# Patient Record
Sex: MALE | Race: WHITE | NOT HISPANIC OR LATINO | Employment: FULL TIME | URBAN - METROPOLITAN AREA
[De-identification: names, ages, dates, MRNs, and addresses within clinical notes are randomized per-mention and may not be internally consistent; named-entity substitution may affect disease eponyms.]

---

## 2017-09-15 ENCOUNTER — CLINICAL SUPPORT (OUTPATIENT)
Dept: OCCUPATIONAL MEDICINE | Facility: CLINIC | Age: 37
End: 2017-09-15

## 2017-09-15 DIAGNOSIS — Z02.83 ENCOUNTER FOR EMPLOYMENT-RELATED DRUG TESTING: ICD-10-CM

## 2017-09-15 PROCEDURE — 80305 DRUG TEST PRSMV DIR OPT OBS: CPT | Mod: S$GLB,,, | Performed by: PHYSICIAN ASSISTANT

## 2018-07-05 ENCOUNTER — CLINICAL SUPPORT (OUTPATIENT)
Dept: OCCUPATIONAL MEDICINE | Facility: CLINIC | Age: 38
End: 2018-07-05

## 2018-07-05 DIAGNOSIS — Z02.83 ENCOUNTER FOR DRUG SCREENING: ICD-10-CM

## 2018-07-05 PROCEDURE — 80305 DRUG TEST PRSMV DIR OPT OBS: CPT | Mod: S$GLB,,, | Performed by: PREVENTIVE MEDICINE

## 2018-07-31 ENCOUNTER — OFFICE VISIT (OUTPATIENT)
Dept: PODIATRY | Facility: CLINIC | Age: 38
End: 2018-07-31
Payer: COMMERCIAL

## 2018-07-31 VITALS — WEIGHT: 293.56 LBS | HEIGHT: 68 IN | BODY MASS INDEX: 44.49 KG/M2

## 2018-07-31 DIAGNOSIS — B35.1 ONYCHOMYCOSIS DUE TO DERMATOPHYTE: ICD-10-CM

## 2018-07-31 DIAGNOSIS — B35.3 TINEA PEDIS OF BOTH FEET: Primary | ICD-10-CM

## 2018-07-31 PROCEDURE — 11720 DEBRIDE NAIL 1-5: CPT | Mod: S$GLB,,, | Performed by: PODIATRIST

## 2018-07-31 PROCEDURE — 3008F BODY MASS INDEX DOCD: CPT | Mod: CPTII,S$GLB,, | Performed by: PODIATRIST

## 2018-07-31 PROCEDURE — 99999 PR PBB SHADOW E&M-EST. PATIENT-LVL III: CPT | Mod: PBBFAC,,, | Performed by: PODIATRIST

## 2018-07-31 PROCEDURE — 99203 OFFICE O/P NEW LOW 30 MIN: CPT | Mod: 25,S$GLB,, | Performed by: PODIATRIST

## 2018-07-31 RX ORDER — CLOTRIMAZOLE AND BETAMETHASONE DIPROPIONATE 10; .64 MG/G; MG/G
CREAM TOPICAL 2 TIMES DAILY
Qty: 45 G | Refills: 2 | Status: SHIPPED | OUTPATIENT
Start: 2018-07-31 | End: 2018-08-30

## 2018-07-31 NOTE — LETTER
August 1, 2018      Walter Lindsay MD  [address]  [facility] [address]           [facility]  [address] [facility] [address] [place] [address]  Phone: [phone]          Patient: Celio Preciado III   MR Number: [id number]   YOB: 1980   Date of Visit: 7/31/2018       Dear Dr. Walter Lindsay:    Thank you for referring Celio Preciado to me for evaluation. Attached you will find relevant portions of my assessment and plan of care.    If you have questions, please do not hesitate to call me. I look forward to following Celio Preciado along with you.    Sincerely,    Madeline Lee DPM    Enclosure  CC:  No Recipients    If you would like to receive this communication electronically, please contact [email or web address] or [phone] to request more information on [employer] access.    For providers and/or their staff who would like to refer a patient to [facility], please contact us through our one-stop-shop provider referral line, [facility], at [phone].    If you feel you have received this communication in error or would no longer like to receive these types of communications, please e-mail [email or web address]

## 2018-08-01 ENCOUNTER — PATIENT MESSAGE (OUTPATIENT)
Dept: PODIATRY | Facility: CLINIC | Age: 38
End: 2018-08-01

## 2018-08-02 NOTE — PROGRESS NOTES
Subjective:      Patient ID: Celio Preciado III is a 37 y.o. male.    Chief Complaint: Foot Problem (right 2 toe nail damage left feet irritation  PCP- Walter Lindsay  5/3/18) and Diabetes Mellitus (A!C 6.4  5-/8/18)    HPI:  Patient presents complaining of right second toe redness and swelling, right second toenail discoloration, and blisters on the bottom of both feet.  He reports it has been ongoing for several weeks and worsened over the last week with new onset of itching to arches and redness and swelling of the entire right second toe.  He relates using daily moisturizing lotion on both feet without improvement.  Patient denies any pain but relates concern due to diabetes and diversion from diabetic diet over the past few months.  He relates awaiting HgA1c results and expects it to have worsened since last test.  Patient denies any other pedal complaints at this time.    Review of Systems   Constitution: Negative for chills, fever and weakness.   Cardiovascular: Negative for chest pain, claudication and leg swelling.   Respiratory: Negative for shortness of breath and wheezing.    Endocrine: Negative for polydipsia, polyphagia and polyuria.   Skin: Positive for color change, dry skin, itching, nail changes and rash.   Musculoskeletal: Positive for arthritis, joint pain, joint swelling and myalgias.   Gastrointestinal: Negative for diarrhea, nausea and vomiting.   Neurological: Positive for sensory change. Negative for disturbances in coordination, numbness and paresthesias.           Objective:       Bilateral pedal exam performed.  Physical Exam   Constitutional: He is oriented to person, place, and time. He appears well-developed and well-nourished. No distress.   Cardiovascular:   Pulses:       Dorsalis pedis pulses are 2+ on the right side, and 2+ on the left side.        Posterior tibial pulses are 2+ on the right side, and 2+ on the left side.   Pedal pulses are palpable 2/4 DP & PT B/L LE.  CFT is <  3 seconds to B/L hallux.  Skin temperature is warm to warm proximal tibia to distal toes without localized increase in calor present.  Erythema and edema without proximal streaking noted to the right 2nd digit with increased skin turgor.   Musculoskeletal: He exhibits edema. He exhibits no tenderness or deformity.        Right foot: There is decreased range of motion. There is no deformity.        Left foot: There is decreased range of motion. There is no deformity.   Generalized decreased ROM noted to the ankle and pedal joints B/L with mildly restricted ankle dorsiflexion with knee extended and flexed per Silverskiold exam.  Muscle strength is 5/5 for all B/L LE muscle groups tested.  No significant pedal abnormalities present.   Feet:   Right Foot:   Protective Sensation: 10 sites tested. 8 sites sensed.   Skin Integrity: Positive for blister, skin breakdown, erythema and dry skin. Negative for ulcer, warmth or callus.   Left Foot:   Protective Sensation: 10 sites tested. 8 sites sensed.   Skin Integrity: Positive for blister, skin breakdown, erythema and dry skin. Negative for ulcer, warmth or callus.   Neurological: He is alert and oriented to person, place, and time. He has normal strength and normal reflexes. He displays normal reflexes. A sensory deficit is present. Coordination and gait normal. He displays no Babinski's sign on the right side. He displays no Babinski's sign on the left side.   Reflex Scores:       Achilles reflexes are 2+ on the right side and 2+ on the left side.  Protective and vibratory sensations are slightly diminished distally B/L foot.  Light touch and proprioceptive sensations are intact to B/L foot.  Achilles DTR and Chaddock STR are intact B/L LE.  No tenderness to palpation of right second digit or to resolving pustules B/L arch.   Skin: Skin is warm and dry. Capillary refill takes less than 2 seconds. Purpura and rash noted. Rash is pustular. There is erythema. No cyanosis.  Nails show no clubbing.   Right 2nd digit exhibits erythema and edema with superficial skin sloughing without open wound.  Right second toenail appears distally mycotic with thickening, discoloration, lysis, and subungal debris.  B/L plantar arch display mixed pustular and superfically erosive lesions with dry, scaly surrounding skin and serous crusting.  Toenails 1-5 B/L except for right 2nd toenail are WNL in length, thickness, and coloration without debris noted.  Webspaces 1-4 B/L are clean, dry, and intact.   Psychiatric: He has a normal mood and affect.   Nursing note and vitals reviewed.            Assessment:       Encounter Diagnoses   Name Primary?    Tinea pedis of both feet Yes    Onychomycosis due to dermatophyte          Plan:       Celio was seen today for foot problem and diabetes mellitus.    Diagnoses and all orders for this visit:    Tinea pedis of both feet  -     clotrimazole-betamethasone 1-0.05% (LOTRISONE) cream; Apply topically 2 (two) times daily. Apply thin layer to both feet including between toes twice daily.    Onychomycosis due to dermatophyte      I counseled the patient on his conditions, their implications and medical management.    - Debrided right 2nd toenail in length and thickness removing all visibly infected nail via nail nippers and electric .    - Prescribed topical antifungal.    - Educated patient on good foot hygiene and prevention of recurrence of fungal infection via washing shoes, socks, bedding, and shower/tub in hot water and white vinegar if doing so will not destroy clothing/bedding.    - Patient to notify clinic if redness and swelling worsened, moving beyond the toe into the foot.    - Follow up in 2 weeks for athlete's foot.

## 2018-08-02 NOTE — PATIENT INSTRUCTIONS
- Apply prescription topical antifungal daily.  Stop applying lotion to foot until athlete's foot resolves.    - Perform good foot hygiene to prevent recurrence of fungal infection via washing shoes, socks, bedding, and shower/tub in hot water and white vinegar if doing so will not destroy clothing/bedding.    - Patient to notify clinic if redness and swelling worsened, moving beyond the toe into the foot.    - Follow up in 2 weeks for athlete's foot.

## 2018-08-14 ENCOUNTER — OFFICE VISIT (OUTPATIENT)
Dept: PODIATRY | Facility: CLINIC | Age: 38
End: 2018-08-14
Payer: COMMERCIAL

## 2018-08-14 VITALS — RESPIRATION RATE: 20 BRPM | WEIGHT: 286.63 LBS | BODY MASS INDEX: 43.44 KG/M2 | HEIGHT: 68 IN

## 2018-08-14 DIAGNOSIS — B35.3 TINEA PEDIS OF BOTH FEET: Primary | ICD-10-CM

## 2018-08-14 DIAGNOSIS — B35.1 ONYCHOMYCOSIS DUE TO DERMATOPHYTE: ICD-10-CM

## 2018-08-14 PROCEDURE — 99999 PR PBB SHADOW E&M-EST. PATIENT-LVL IV: CPT | Mod: PBBFAC,,, | Performed by: PODIATRIST

## 2018-08-14 PROCEDURE — 99212 OFFICE O/P EST SF 10 MIN: CPT | Mod: S$GLB,,, | Performed by: PODIATRIST

## 2018-08-14 PROCEDURE — 3008F BODY MASS INDEX DOCD: CPT | Mod: CPTII,S$GLB,, | Performed by: PODIATRIST

## 2018-08-14 NOTE — PROGRESS NOTES
Subjective:      Patient ID: Celio Preciado III is a 37 y.o. male.    Chief Complaint: Foot Problem (follow up for Tinea pedis of both feet) and Other Misc (PCP: Angélica 5/38/18 ---- A1C: 6.4 )    HPI:  Patient presents complaining of right second toe redness and swelling without pain and is concerned about nail growth.  He reports using prescription and performing foot and shoe hygiene as instructed.  He relates being very happy with his improvement.  Patient admits to returning to diabetic diet and has been walking daily for exercise.  He inquires how much weight he has lost since his last visit.  He states that his feet are no longer itching.  Patient denies any other pedal complaints at this time.    Review of Systems   Endocrine: Negative for polydipsia, polyphagia and polyuria.   Skin: Positive for color change, dry skin and nail changes. Negative for itching and rash.   Musculoskeletal: Positive for arthritis, joint pain, joint swelling and myalgias.   Neurological: Positive for sensory change. Negative for numbness and paresthesias.           Objective:       Bilateral pedal exam performed.  Physical Exam   Constitutional: He is oriented to person, place, and time. He appears well-developed and well-nourished. No distress.   Cardiovascular:   Pulses:       Dorsalis pedis pulses are 2+ on the right side, and 2+ on the left side.        Posterior tibial pulses are 2+ on the right side, and 2+ on the left side.   Pedal pulses are palpable 2/4 DP & PT B/L LE.  CFT is < 3 seconds to B/L hallux.  Skin temperature is warm to warm proximal tibia to distal toes without localized increase in calor present.  Minimal erythema and edema without proximal streaking noted to the right 2nd digit with supple skin.  Hair growth noted distal toes B/L foot.   Musculoskeletal: He exhibits edema. He exhibits no tenderness or deformity.        Right foot: There is decreased range of motion. There is no deformity.        Left  foot: There is decreased range of motion. There is no deformity.   Generalized decreased ROM noted to the ankle and pedal joints B/L with mildly restricted ankle dorsiflexion with knee extended and flexed per Silverskiollily exam.  Muscle strength is 5/5 for all B/L LE muscle groups tested.  No significant pedal abnormalities present.   Feet:   Right Foot:   Protective Sensation: 10 sites tested. 8 sites sensed.   Skin Integrity: Positive for blister, erythema and dry skin. Negative for ulcer, skin breakdown, warmth or callus.   Left Foot:   Protective Sensation: 10 sites tested. 8 sites sensed.   Skin Integrity: Positive for erythema and dry skin. Negative for ulcer, blister, skin breakdown, warmth or callus.   Neurological: He is alert and oriented to person, place, and time. He has normal strength and normal reflexes. He displays normal reflexes. A sensory deficit is present. He exhibits normal muscle tone. Coordination and gait normal. He displays no Babinski's sign on the right side. He displays no Babinski's sign on the left side.   Reflex Scores:       Achilles reflexes are 2+ on the right side and 2+ on the left side.  Epicritic sensations are slightly diminished distally B/L foot. Oppenheim and Chaddock STRs are intact B/L LE.  No tenderness to palpation of right second digit or to resolving pustules right arch.   Skin: Skin is warm and dry. Capillary refill takes less than 2 seconds. Lesion noted. No abrasion, no bruising, no burn, no ecchymosis, no laceration, no petechiae and no rash noted. He is not diaphoretic. There is erythema. No cyanosis. Nails show no clubbing.   Right 2nd digit exhibits erythema and edema stunting nail growth with residual thickness and yellow discoloration.  B/L plantar arch display dry, scaly skin and resolving pustular crusts.  Toenails 1-5 B/L are WNL in length, thickness, and coloration without debris noted.  Webspaces 1-4 B/L are clean, dry, and intact.   Psychiatric: He has a  normal mood and affect. His behavior is normal. Judgment and thought content normal.   Nursing note and vitals reviewed.            Assessment:       Encounter Diagnoses   Name Primary?    Tinea pedis of both feet Yes    Onychomycosis due to dermatophyte - Right Foot          Plan:       Celio was seen today for foot problem and other misc.    Diagnoses and all orders for this visit:    Tinea pedis of both feet    Onychomycosis due to dermatophyte - Right Foot      I counseled the patient on his conditions, their implications and medical management.    - Reviewed with patient expected prognostic course and urged him to keep up with therapy as he has improved greatly.    - Instructed patient to continue with good foot and shoe hygiene.    - Advised patient to use clotrimazole-betamethasone cream as prescribed for 2 more weeks and then taper down to every other day for a week, followed by every 2 days for 2 weeks before stopping completely.    - Recommended patient use disposable radha boards once a week to lightly file top of the right 2nd toenail and apply white vinegar soaked cotton ball on toenail every other day for 5 minutes.    - Encouraged patient to maintain diet and exercise habits for weight loss and blood sugar control.    - Follow up in 1 month for athlete's foot.

## 2018-08-14 NOTE — PATIENT INSTRUCTIONS
- Continue with good foot and shoe hygiene.    - Use clotrimazole-betamethasone cream as prescribed for 2 more weeks and then taper down to every other day for a week, followed by every 2 days for 2 weeks before stopping completely.    - Use disposable radha boards once a week to lightly file top of the right 2nd toenail and apply white vinegar soaked cotton ball on toenail every other day for 5 minutes.    - Maintain diet and exercise habits for weight loss and blood sugar control.    - Follow up in 1 month for athlete's foot.

## 2018-09-11 ENCOUNTER — OFFICE VISIT (OUTPATIENT)
Dept: PODIATRY | Facility: CLINIC | Age: 38
End: 2018-09-11
Payer: COMMERCIAL

## 2018-09-11 VITALS — WEIGHT: 289 LBS | HEIGHT: 68 IN | BODY MASS INDEX: 43.8 KG/M2

## 2018-09-11 DIAGNOSIS — S90.521A BLISTER OF RIGHT ANKLE, INITIAL ENCOUNTER: ICD-10-CM

## 2018-09-11 DIAGNOSIS — F42.8 ONYCHOTILLOMANIA: ICD-10-CM

## 2018-09-11 DIAGNOSIS — R23.4 FISSURE IN SKIN OF FOOT: ICD-10-CM

## 2018-09-11 DIAGNOSIS — B35.1 ONYCHOMYCOSIS DUE TO DERMATOPHYTE: Primary | ICD-10-CM

## 2018-09-11 DIAGNOSIS — B35.3 TINEA PEDIS OF BOTH FEET: ICD-10-CM

## 2018-09-11 PROCEDURE — 99213 OFFICE O/P EST LOW 20 MIN: CPT | Mod: S$GLB,,, | Performed by: PODIATRIST

## 2018-09-11 PROCEDURE — 99999 PR PBB SHADOW E&M-EST. PATIENT-LVL III: CPT | Mod: PBBFAC,,, | Performed by: PODIATRIST

## 2018-09-11 PROCEDURE — 3008F BODY MASS INDEX DOCD: CPT | Mod: CPTII,S$GLB,, | Performed by: PODIATRIST

## 2018-09-11 NOTE — PROGRESS NOTES
Subjective:      Patient ID: Celio Preciado III is a 37 y.o. male.    Chief Complaint: Follow-up (4 week f/u foot care, PCP--05/03/2018) and Diabetes Mellitus (A1c-6.4-05/03/2018)    HPI:  Patient presents complaining of residual right second toe swelling without pain.  He reports using white vinegar on his toenail by briefly wiping it on and allowing it to air dry, not performing cotton ball or foot soaks as previously instructed.  He states he is still performing shoe hygiene as instructed.  He denies any pain or itching in his feet but relates flare a few days after his last appointment.  Patient admits to not wearing socks to football game this weekend and sustained a blister that rubbed until it became deroofed.  He denies any issues with the blister area and has not dressed it with antibiotic or a bandaid.  He also relates having dry, cracked skin on the bottom of his right heel and inquires what he should do for it.   Patient denies any other pedal complaints at this time.    Review of Systems   Endocrine: Negative for polydipsia, polyphagia and polyuria.   Skin: Positive for color change, dry skin, nail changes and rash. Negative for itching.   Neurological: Positive for sensory change. Negative for numbness and paresthesias.           Objective:       Bilateral pedal exam performed.  Physical Exam   Constitutional: He is oriented to person, place, and time. He appears well-developed and well-nourished. No distress.   HENT:   Head: Normocephalic and atraumatic.   Eyes: Conjunctivae and EOM are normal. Pupils are equal, round, and reactive to light.   Neck: Normal range of motion.   Cardiovascular:   Pulses:       Dorsalis pedis pulses are 2+ on the right side, and 2+ on the left side.        Posterior tibial pulses are 2+ on the right side, and 2+ on the left side.   Pedal pulses are palpable 2/4 DP & PT B/L LE.  CFT is < 3 seconds to B/L hallux.  Skin temperature is warm to warm proximal  tibia to distal toes without localized increase in calor present.  Minimal erythema and edema without proximal streaking noted to the right 2nd digit distally, right plantar medial arch, right 4th webspace, and right plantar heel fissure.  Hair growth noted distal toes B/L foot.   Pulmonary/Chest: Effort normal and breath sounds normal. No respiratory distress. He has no wheezes.   Musculoskeletal: He exhibits edema. He exhibits no tenderness or deformity.        Right foot: There is decreased range of motion. There is no deformity.        Left foot: There is decreased range of motion. There is no deformity.   Generalized decreased ROM noted to the ankle and pedal joints B/L with mildly restricted ankle dorsiflexion with knee extended and [facility].  Muscle strength is 5/5 for all B/L LE muscle groups tested.  No significant pedal abnormalities present.   Feet:   Right Foot:   Protective Sensation: 10 sites tested. 8 sites sensed.   Skin Integrity: Positive for blister, erythema and dry skin. Negative for ulcer, skin breakdown, warmth or callus.   Left Foot:   Protective Sensation: 10 sites tested. 8 sites sensed.   Skin Integrity: Positive for erythema and dry skin. Negative for ulcer, blister, skin breakdown, warmth or callus.   Neurological: He is alert and oriented to person, place, and time. He has normal strength and normal reflexes. He displays normal reflexes. A sensory deficit is present. He exhibits normal muscle tone. Coordination and gait normal. He displays no Babinski's sign on the right side. He displays no Babinski's sign on the left side.   Reflex Scores:       Achilles reflexes are 2+ on the right side and 2+ on the left side.  Epicritic sensations are slightly diminished distally B/L foot. Oppenheim and Chaddock STRs are intact B/L LE.  No tenderness to palpation of right second digit, resolving scaly patch right arch and right 4th webspace, right heel fissure, or right ankle  resolving seroma.   Skin: Skin is warm and dry. Capillary refill takes less than 2 seconds. Lesion noted. No abrasion, no bruising, no burn, no ecchymosis, no laceration, no petechiae and no rash noted. He is not diaphoretic. There is erythema. No cyanosis. Nails show no clubbing.   Right 2nd digit exhibits residual erythema and edema stunting nail growth with residual thickness and yellow discoloration in distomedial aspect of toenail with minimal subungal debris present.  B/L plantar arch displays resolving, dry, scaly skin without pustules.  Resolving, partially deroofed, dry lesion noted to the posterior right heel that has recently epithelialized with hemosiderin deposition retained under partially reattached roof of seroma.  Superficial fissure measuring approximately 3 cm x 0.1 cm x 0.2 cm with hyperkeratotic edges extending down to superficial dermis noted to the right plantar heel without louis signs of infection or drainage.  Toenails 1-5 B/L with exception of right 2nd toenail are WNL in thickness and coloration without debris noted but exhibit irregular distal edge from picking and peeling of the nails.  Webspaces 1-4 B/L are clean, dry, and intact with mild scaling in right 4th webspace present.   Psychiatric: He has a normal mood and affect. His behavior is normal. Judgment and thought content normal.   Nursing note and vitals reviewed.        Assessment:       Encounter Diagnoses   Name Primary?    Onychomycosis due to dermatophyte - Right Foot Yes    Tinea pedis of both feet     Fissure in skin of foot - Right Foot     Blister of right ankle, initial encounter - Right Foot     Onychotillomania          Plan:       Celio was seen today for follow-up and diabetes mellitus.    Diagnoses and all orders for this visit:    Onychomycosis due to dermatophyte - Right Foot    Tinea pedis of both feet    Fissure in skin of foot - Right Foot    Blister of right ankle, initial encounter - Right  Foot    Onychotillomania      I counseled the patient on his conditions, their implications and medical management.    - Performed curettage of subungal debris from right 2nd toenail.    Patient was given the following recommendations and instructions:  Patient Instructions   - Continue with good foot and shoe hygiene.    - Use clotrimazole-betamethasone cream as prescribed for 2 weeks to right heel fissure and then stop.    - Use disposable radha boards once a week to lightly file callous off right heel fissure.    - Use disposable radha boards once a week to lightly file top of the right 2nd toenail and apply white vinegar soaked cotton ball on toenail every other day for 5 minutes.    - Wear shoes that don't rub on healing blister right ankle.    - File or clip nails when they need to be trimmed - don't pick at them.    - Follow up in 1 month for athlete's foot and heel fissure.        Madeline Lee DPM

## 2018-09-11 NOTE — PATIENT INSTRUCTIONS
- Continue with good foot and shoe hygiene.    - Use clotrimazole-betamethasone cream as prescribed for 2 weeks to right heel fissure and then stop.    - Use disposable radha boards once a week to lightly file callous off right heel fissure.    - Use disposable radha boards once a week to lightly file top of the right 2nd toenail and apply white vinegar soaked cotton ball on toenail every other day for 5 minutes.    - Wear shoes that don't rub on healing blister right ankle.    - File or clip nails when they need to be trimmed - don't pick at them.    - Follow up in 1 month for athlete's foot and heel fissure.

## 2018-11-05 PROBLEM — R23.4 FISSURE IN SKIN OF FOOT: Status: RESOLVED | Noted: 2018-09-11 | Resolved: 2018-11-05

## 2018-11-05 PROBLEM — B35.1 ONYCHOMYCOSIS DUE TO DERMATOPHYTE: Status: RESOLVED | Noted: 2018-09-11 | Resolved: 2018-11-05

## 2019-05-10 ENCOUNTER — OFFICE VISIT (OUTPATIENT)
Dept: PSYCHIATRY | Facility: CLINIC | Age: 39
End: 2019-05-10
Payer: COMMERCIAL

## 2019-05-10 DIAGNOSIS — F43.22 ADJUSTMENT REACTION WITH ANXIETY: ICD-10-CM

## 2019-05-10 PROBLEM — F42.8: Status: RESOLVED | Noted: 2018-09-11 | Resolved: 2019-05-10

## 2019-05-10 PROCEDURE — 90791 PSYCH DIAGNOSTIC EVALUATION: CPT | Mod: S$GLB,,, | Performed by: SOCIAL WORKER

## 2019-05-10 PROCEDURE — 99999 PR PBB SHADOW E&M-EST. PATIENT-LVL II: ICD-10-PCS | Mod: PBBFAC,,, | Performed by: SOCIAL WORKER

## 2019-05-10 PROCEDURE — 99999 PR PBB SHADOW E&M-EST. PATIENT-LVL II: CPT | Mod: PBBFAC,,, | Performed by: SOCIAL WORKER

## 2019-05-10 PROCEDURE — 90791 PR PSYCHIATRIC DIAGNOSTIC EVALUATION: ICD-10-PCS | Mod: S$GLB,,, | Performed by: SOCIAL WORKER

## 2019-05-10 NOTE — PROGRESS NOTES
Psychiatry Initial Visit (PhD/LCSW)  Diagnostic Interview - CPT 08297    Date: 5/10/2019    Site: [place]    Referral source: Kaleb Wilson, PhD    Clinical status of patient: Outpatient    Celio Preciado III, a 38 y.o. male, for initial evaluation visit.  Met with patient.    Chief complaint/reason for encounter: anxiety    History of present illness: Wife in treatment for psychosis, sees Dr. Wilson and Darling Brar. She spent week at [facility] following stay at ER. Refused antipsychotic and resistant to antidepressant, but he said if she wanted to stay , she had to take it. She did. It started working about 10 days ago, and she's much better. Children 4,11, & 14, his primary concern. Wife was paranoid, talking about boogie monsters, had hallucinations, asked for guns to be removed. Here wondering how to support her. Has taken over household duties, but she's beginning to ask for them back. He's not talking with her much, treating her as though she's fragile. I suggested they talk. Relapse prevention planning could help, showed him how.    He doesn't pull out his nails, rather trims them by hand rather than clipper. No dx there.    Pain: noncontributory    Symptoms:   · Mood: denied  · Anxiety: excessive anxiety/worry  · Substance abuse: denied  · Cognitive functioning: denied  · Health behaviors: noncontributory    Psychiatric history: none    Medical history:   Past Medical History:   Diagnosis Date    Abnormal liver enzymes     Dermatophytosis of foot     Hemorrhoids     MRSA (methicillin resistant Staphylococcus aureus)     L Great Toe    Obesity, unspecified     Other abnormal glucose     Other and unspecified hyperlipidemia     Other staphylococcus infection in conditions classified elsewhere and of unspecified site     Pain in joint, shoulder region     Type II or unspecified type diabetes mellitus without mention of complication, not stated as uncontrolled      Unspecified essential hypertension        Family history of psychiatric illness:   Family History   Problem Relation Age of Onset    Diabetes Paternal Grandfather     Heart disease Paternal Grandfather     Alzheimer's disease Paternal Grandfather     Diabetes Father        Social history (marriage, employment, etc.):   Social History     Tobacco Use    Smoking status: Never Smoker    Smokeless tobacco: Never Used   Substance Use Topics    Alcohol use: Yes     Frequency: 2-4 times a month     Drinks per session: 3 or 4     Binge frequency: Less than monthly     Comment: Occasionally    Drug use: No       Current medications and drug reactions (include OTC, herbal): see medication list     Strengths and liabilities: Strength: Patient accepts guidance/feedback, Strength: Patient is expressive/articulate., Strength: Patient is intelligent., Strength: Patient is motivated for change., Strength: Patient is physically healthy., Strength: Patient has positive support network., Strength: Patient has reasonable judgment., Strength: Patient is stable.    Current Evaluation:     Mental Status Exam:  General Appearance:  unremarkable, age appropriate   Speech: normal tone, normal rate, normal pitch, normal volume      Level of Cooperation: cooperative      Thought Processes: normal and logical   Mood: steady      Thought Content: normal, no suicidality, no homicidality, delusions, or paranoia   Affect: congruent and appropriate   Orientation: Oriented x3   Memory: recent >  intact   Attention Span & Concentration: intact   Fund of General Knowledge: intact and appropriate to age and level of education   Abstract Reasoning: interpretation of similarities was abstract   Judgment & Insight: good     Language  intact     Diagnostic Impression - Plan:       ICD-10-CM ICD-9-CM   1. Adjustment reaction with anxiety F43.22 309.24       Plan:individual psychotherapy    Return to Clinic: 2 weeks    Length of Service (minutes):  45

## 2019-05-31 ENCOUNTER — OFFICE VISIT (OUTPATIENT)
Dept: PSYCHIATRY | Facility: CLINIC | Age: 39
End: 2019-05-31
Payer: COMMERCIAL

## 2019-05-31 DIAGNOSIS — F43.22 ADJUSTMENT REACTION WITH ANXIETY: Primary | ICD-10-CM

## 2019-05-31 PROCEDURE — 99999 PR PBB SHADOW E&M-EST. PATIENT-LVL II: ICD-10-PCS | Mod: PBBFAC,,, | Performed by: SOCIAL WORKER

## 2019-05-31 PROCEDURE — 90834 PR PSYCHOTHERAPY W/PATIENT, 45 MIN: ICD-10-PCS | Mod: S$GLB,,, | Performed by: SOCIAL WORKER

## 2019-05-31 PROCEDURE — 99999 PR PBB SHADOW E&M-EST. PATIENT-LVL II: CPT | Mod: PBBFAC,,, | Performed by: SOCIAL WORKER

## 2019-05-31 PROCEDURE — 90834 PSYTX W PT 45 MINUTES: CPT | Mod: S$GLB,,, | Performed by: SOCIAL WORKER

## 2019-05-31 NOTE — PROGRESS NOTES
"Individual Psychotherapy (PhD/LCSW)    5/31/2019    Site:  [place]       Therapeutic Intervention: Met with patient.  Outpatient - Supportive psychotherapy 45 min - CPT Code 31177 and Outpatient - Interactive psychotherapy 45 min - CPT code 36461    Chief complaint/reason for encounter: anxiety and interpersonal     Interval history and content of current session: Wife much better, "Like she's 18 again." No manic symptoms. She's relaxed, at ease with daughter's driving in neighborhood. Fighting decreased. Substantial differences: she's into people, details, gossip; he's into action, doing, experience, doesn't care about the latest on this one or that one. How do we enjoy our differences? Getting to point in marriage where that may be possible. He's been trying to get her to do it all his way.    Treatment plan:  · Target symptoms: anxiety , adjustment  · Why chosen therapy is appropriate versus another modality: relevant to diagnosis, patient responds to this modality, evidence based practice  · Outcome monitoring methods: self-report, observation, checklist/rating scale  · Therapeutic intervention type: insight oriented psychotherapy, supportive psychotherapy, interactive psychotherapy    Risk parameters:  Patient reports no suicidal ideation  Patient reports no homicidal ideation  Patient reports no self-injurious behavior  Patient reports no violent behavior    Verbal deficits: None    Patient's response to intervention:  The patient's response to intervention is accepting.    Progress toward goals and other mental status changes:  The patient's progress toward goals is excellent.    Diagnosis:   1. Adjustment reaction with anxiety        Plan:  individual psychotherapy    Return to clinic: 2 weeks    Length of Service (minutes): 45    "

## 2019-06-19 ENCOUNTER — OFFICE VISIT (OUTPATIENT)
Dept: PSYCHIATRY | Facility: CLINIC | Age: 39
End: 2019-06-19
Payer: COMMERCIAL

## 2019-06-19 DIAGNOSIS — F43.22 ADJUSTMENT REACTION WITH ANXIETY: Primary | ICD-10-CM

## 2019-06-19 PROCEDURE — 99999 PR PBB SHADOW E&M-EST. PATIENT-LVL II: CPT | Mod: PBBFAC,,, | Performed by: SOCIAL WORKER

## 2019-06-19 PROCEDURE — 90834 PSYTX W PT 45 MINUTES: CPT | Mod: S$GLB,,, | Performed by: SOCIAL WORKER

## 2019-06-19 PROCEDURE — 99999 PR PBB SHADOW E&M-EST. PATIENT-LVL II: ICD-10-PCS | Mod: PBBFAC,,, | Performed by: SOCIAL WORKER

## 2019-06-19 PROCEDURE — 90834 PR PSYCHOTHERAPY W/PATIENT, 45 MIN: ICD-10-PCS | Mod: S$GLB,,, | Performed by: SOCIAL WORKER

## 2019-06-19 NOTE — PROGRESS NOTES
"Individual Psychotherapy (PhD/LCSW)    6/19/2019    Site:  [facility]  [facility] [place]          Therapeutic Intervention: Met with patient.  Outpatient - Supportive psychotherapy 45 min - CPT Code 32133 and Outpatient - Interactive psychotherapy 45 min - CPT code 67183    Chief complaint/reason for encounter: depression, anxiety and interpersonal     Interval history and content of current session: Reviewed chart. Life continues much better. 5 yo is out of marital bed at pt's request, though Mom unhappy about it. Pt doing requested because wife not sleeping much. Son now in with daughter. Common issue is conflict with wife over risk-taking vs safety with kids. Mom always advocates for safety. Bulk of session concerned parenting, need for both to be on same page, even at cost of slight loss of his own will. "That's hard, a blow to my ego." In service of your three children, worth it? He's not sure.    Treatment plan:  · Target symptoms: depression, anxiety , adjustment  · Why chosen therapy is appropriate versus another modality: relevant to diagnosis, patient responds to this modality, evidence based practice  · Outcome monitoring methods: self-report, observation, checklist/rating scale  · Therapeutic intervention type: insight oriented psychotherapy, supportive psychotherapy, interactive psychotherapy    Risk parameters:  Patient reports no suicidal ideation  Patient reports no homicidal ideation  Patient reports no self-injurious behavior  Patient reports no violent behavior    Verbal deficits: None    Patient's response to intervention:  The patient's response to intervention is accepting.    Progress toward goals and other mental status changes:  The patient's progress toward goals is good.    Diagnosis:   1. Adjustment reaction with anxiety        Plan:  individual psychotherapy    Return to clinic: 1 week    Length of Service (minutes): 45 minutes  "

## 2019-09-05 ENCOUNTER — OFFICE VISIT (OUTPATIENT)
Dept: PSYCHIATRY | Facility: CLINIC | Age: 39
End: 2019-09-05
Payer: COMMERCIAL

## 2019-09-05 DIAGNOSIS — F43.22 ADJUSTMENT REACTION WITH ANXIETY: Primary | ICD-10-CM

## 2019-09-05 PROCEDURE — 90834 PSYTX W PT 45 MINUTES: CPT | Mod: S$GLB,,, | Performed by: SOCIAL WORKER

## 2019-09-05 PROCEDURE — 99999 PR PBB SHADOW E&M-EST. PATIENT-LVL II: ICD-10-PCS | Mod: PBBFAC,,, | Performed by: SOCIAL WORKER

## 2019-09-05 PROCEDURE — 99999 PR PBB SHADOW E&M-EST. PATIENT-LVL II: CPT | Mod: PBBFAC,,, | Performed by: SOCIAL WORKER

## 2019-09-05 PROCEDURE — 90834 PR PSYCHOTHERAPY W/PATIENT, 45 MIN: ICD-10-PCS | Mod: S$GLB,,, | Performed by: SOCIAL WORKER

## 2019-09-05 NOTE — PROGRESS NOTES
Individual Psychotherapy (PhD/LCSW)    9/5/2019    Site:  [facility]  [facility] [place]          Therapeutic Intervention: Met with patient.  Outpatient - Supportive psychotherapy 45 min - CPT Code 40397 and Outpatient - Interactive psychotherapy 45 min - CPT code 03352    Chief complaint/reason for encounter: depression and interpersonal     Interval history and content of current session: Reviewed chart. Hadn't seen since beginning of summer. Doing well. Family vacation to beach, some dating with wife. 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 He's worked to set some boundaries with his customers, but some take advantage, calling evenings, weekends, and holidays, and he feels he must take calls. Son back in bed half the time: he and wife don't discuss, and kid curls up with wife in bed. Suggested, since he's just starting school, that waiting until he's adjusted to current situation might be best. When he has, pt and wife must be on same page. Helps teach self-soothing.    Treatment plan:  · Target symptoms: depression, adjustment  · Why chosen therapy is appropriate versus another modality: relevant to diagnosis, patient responds to this modality, evidence based practice  · Outcome monitoring methods: self-report  · Therapeutic intervention type: insight oriented psychotherapy, supportive psychotherapy, interactive psychotherapy    Risk parameters:  Patient reports no suicidal ideation  Patient reports no homicidal ideation  Patient reports no self-injurious behavior  Patient reports no violent behavior    Verbal deficits: None    Patient's response to intervention:  The patient's response to intervention is accepting.    Progress toward goals and other mental status changes:  The patient's progress toward goals is good.    Diagnosis:   1. Adjustment reaction with anxiety         Plan:  individual psychotherapy    Return to clinic: 1 week    Length of Service (minutes): 45 minutes

## 2019-10-06 PROBLEM — I20.89 ANGINA AT REST: Status: ACTIVE | Noted: 2019-10-06

## 2019-10-06 PROBLEM — E78.00 HYPERCHOLESTEREMIA: Status: ACTIVE | Noted: 2019-10-06

## 2019-10-06 PROBLEM — R07.2 PRECORDIAL PAIN: Status: ACTIVE | Noted: 2019-10-06

## 2019-10-06 PROBLEM — R07.2 PRECORDIAL PAIN: Status: RESOLVED | Noted: 2019-10-06 | Resolved: 2019-10-06

## 2019-10-07 PROBLEM — R94.39 ABNORMAL STRESS ECG: Status: ACTIVE | Noted: 2019-10-07

## 2019-10-07 PROBLEM — R73.03 PRE-DIABETES: Status: ACTIVE | Noted: 2019-10-07

## 2019-10-30 PROBLEM — Z78.9 STATIN INTOLERANCE: Status: ACTIVE | Noted: 2019-10-30

## 2020-05-12 PROBLEM — I20.89 ANGINA AT REST: Status: RESOLVED | Noted: 2019-10-06 | Resolved: 2020-05-12

## 2021-02-09 PROBLEM — K21.9 GASTROESOPHAGEAL REFLUX DISEASE: Status: ACTIVE | Noted: 2021-02-09

## 2021-10-13 PROBLEM — R73.03 PRE-DIABETES: Status: RESOLVED | Noted: 2019-10-07 | Resolved: 2021-10-13

## 2021-10-13 PROBLEM — R94.39 ABNORMAL STRESS ECG: Status: RESOLVED | Noted: 2019-10-07 | Resolved: 2021-10-13

## 2021-10-13 PROBLEM — F43.22 ADJUSTMENT REACTION WITH ANXIETY: Status: RESOLVED | Noted: 2019-05-10 | Resolved: 2021-10-13
